# Patient Record
Sex: MALE | Race: WHITE | NOT HISPANIC OR LATINO | Employment: FULL TIME | URBAN - METROPOLITAN AREA
[De-identification: names, ages, dates, MRNs, and addresses within clinical notes are randomized per-mention and may not be internally consistent; named-entity substitution may affect disease eponyms.]

---

## 2018-03-09 ENCOUNTER — OFFICE VISIT (OUTPATIENT)
Dept: OPHTHALMOLOGY | Age: 28
End: 2018-03-09

## 2018-03-09 DIAGNOSIS — H52.7 DISORDER OF REFRACTION: Primary | ICD-10-CM

## 2018-03-09 DIAGNOSIS — Z46.0 CONTACT LENS/GLASSES FITTING: ICD-10-CM

## 2018-03-09 PROCEDURE — 92310 CONTACT LENS FITTING OU: CPT | Performed by: OPTOMETRIST

## 2018-03-09 PROCEDURE — 92015 DETERMINE REFRACTIVE STATE: CPT | Performed by: OPTOMETRIST

## 2018-03-09 PROCEDURE — 92014 COMPRE OPH EXAM EST PT 1/>: CPT | Performed by: OPTOMETRIST

## 2018-03-09 ASSESSMENT — REFRACTION_CURRENTRX
OD_BRAND: ACV OASYS TORIC
OD_BASECURVE: 8.6
OS_BASECURVE: 8.6
OD_SPHERE: -2.75
OS_CYLINDER: -1.75
OD_CYLINDER: -0.75
OS_AXIS: 140
OS_SPHERE: -2.50
OS_BRAND: ACV OASYS TORIC
OD_AXIS: 040

## 2018-03-09 ASSESSMENT — VISUAL ACUITY
OS_SC: 20/200
OS_CC: 20/20
CORRECTION_TYPE: GLASSES
OD_CC: 20/20
METHOD: SNELLEN - LINEAR
OD_SC: 20/200

## 2018-03-09 ASSESSMENT — REFRACTION_MANIFEST
OS_CYLINDER: +1.75
OD_AXIS: 120
OS_AXIS: 060
OS_SPHERE: -4.00
OD_SPHERE: -3.50
OD_CYLINDER: +0.75

## 2018-03-09 ASSESSMENT — REFRACTION_WEARINGRX
OS_CYLINDER: +1.75
OS_AXIS: 50
OD_SPHERE: -3.50
OD_AXIS: 130
OS_SPHERE: -4.25
OD_CYLINDER: +0.75

## 2018-03-09 ASSESSMENT — TONOMETRY
IOP_METHOD: NON-CONTACT AIR PUFF
OD_IOP_MMHG: 15
OS_IOP_MMHG: 16

## 2019-04-02 ENCOUNTER — OFFICE VISIT (OUTPATIENT)
Dept: URGENT CARE | Facility: URGENT CARE | Age: 29
End: 2019-04-02
Payer: OTHER MISCELLANEOUS

## 2019-04-02 VITALS
DIASTOLIC BLOOD PRESSURE: 77 MMHG | RESPIRATION RATE: 12 BRPM | TEMPERATURE: 98.2 F | BODY MASS INDEX: 33.48 KG/M2 | HEIGHT: 78 IN | WEIGHT: 289.4 LBS | OXYGEN SATURATION: 98 % | HEART RATE: 69 BPM | SYSTOLIC BLOOD PRESSURE: 124 MMHG

## 2019-04-02 DIAGNOSIS — M54.50 ACUTE BILATERAL LOW BACK PAIN WITHOUT SCIATICA: Primary | ICD-10-CM

## 2019-04-02 PROCEDURE — 99204 OFFICE O/P NEW MOD 45 MIN: CPT | Mod: 25 | Performed by: NURSE PRACTITIONER

## 2019-04-02 PROCEDURE — 96372 THER/PROPH/DIAG INJ SC/IM: CPT | Performed by: NURSE PRACTITIONER

## 2019-04-02 RX ORDER — KETOROLAC TROMETHAMINE 30 MG/ML
60 INJECTION, SOLUTION INTRAMUSCULAR; INTRAVENOUS ONCE
Status: COMPLETED | OUTPATIENT
Start: 2019-04-02 | End: 2019-04-02

## 2019-04-02 RX ORDER — KETOROLAC TROMETHAMINE 10 MG/1
10 TABLET, FILM COATED ORAL EVERY 6 HOURS PRN
Qty: 20 TABLET | Refills: 0 | Status: SHIPPED | OUTPATIENT
Start: 2019-04-02 | End: 2019-04-07

## 2019-04-02 RX ORDER — CYCLOBENZAPRINE HCL 10 MG
TABLET ORAL
Refills: 0 | COMMUNITY
Start: 2019-03-12

## 2019-04-02 RX ORDER — CYCLOBENZAPRINE HCL 10 MG
5-10 TABLET ORAL 3 TIMES DAILY PRN
Qty: 30 TABLET | Refills: 0 | Status: SHIPPED | OUTPATIENT
Start: 2019-04-02 | End: 2019-04-09

## 2019-04-02 RX ADMIN — KETOROLAC TROMETHAMINE 60 MG: 30 INJECTION, SOLUTION INTRAMUSCULAR; INTRAVENOUS at 15:22

## 2019-04-02 ASSESSMENT — ENCOUNTER SYMPTOMS
SORE THROAT: 0
COUGH: 0
FEVER: 0
NAUSEA: 0
BACK PAIN: 1
CHILLS: 0
DIARRHEA: 0
SHORTNESS OF BREATH: 0
VOMITING: 0
RHINORRHEA: 0
DIAPHORESIS: 0

## 2019-04-02 ASSESSMENT — PAIN SCALES - GENERAL: PAINLEVEL: MILD PAIN (3)

## 2019-04-02 ASSESSMENT — MIFFLIN-ST. JEOR: SCORE: 2405.96

## 2019-04-02 NOTE — PATIENT INSTRUCTIONS
Patient Education     Back Care Tips    Caring for your back  These are things you can do to prevent a recurrence of acute back pain and to reduce symptoms from chronic back pain:    Maintain a healthy weight. If you are overweight, losing weight will help most types of back pain.    Exercise is an important part of recovery from most types of back pain. The muscles behind and in front of the spine support the back. This means strengthening both the back muscles and the abdominal muscles will provide better support for your spine.     Swimming and brisk walking are good overall exercises to improve your fitness level.    Practice safe lifting methods (below).    Practice good posture when sitting, standing and walking. Avoid prolonged sitting. This puts more stress on the lower back than standing or walking.    Wear quality shoes with sufficient arch support. Foot and ankle alignment can affect back symptoms. Women should avoid wearing high heels.    Therapeutic massage can help relax the back muscles without stretching them.    During the first 24 to 72 hours after an acute injury or flare-up of chronic back pain, apply an ice pack to the painful area for 20 minutes and then remove it for 20 minutes, over a period of 60 to 90 minutes, or several times a day. As a safety precaution, do not use a heating pad at bedtime. Sleeping on a heating pad can lead to skin burns or tissue damage.    You can alternate ice and heat therapies.  Medicines  Talk to your healthcare provider before using medicines, especially if you have other medical problems or are taking other medicines.    You may use acetaminophen or ibuprofen to control pain, unless your healthcare provider prescribed other pain medicine. If you have chronic conditions like diabetes, liver or kidney disease, stomach ulcers, or gastrointestinal bleeding, or are taking blood thinners, talk with your healthcare provider before taking any medicines.    Be careful  if you are given prescription pain medicines, narcotics, or medicine for muscle spasm. They can cause drowsiness, affect your coordination, reflexes, and judgment. Do not drive or operate heavy machinery while taking these types of medicines. Take prescription pain medicine only as prescribed by your healthcare provider.  Lumbar stretch  Here is a simple stretching exercise that will help relax muscle spasm and keep your back more limber. If exercise makes your back pain worse, don t do it.    Lie on your back with your knees bent and both feet on the ground.    Slowly raise your left knee to your chest as you flatten your lower back against the floor. Hold for 5 seconds.    Relax and repeat the exercise with your right knee.    Do 10 of these exercises for each leg.  Safe lifting method    Don t bend over at the waist to lift an object off the floor.  Instead, bend your knees and hips in a squat.     Keep your back and head upright    Hold the object close to your body, directly in front of you.    Straighten your legs to lift the object.     Lower the object to the floor in the reverse fashion.    If you must slide something across the floor, push it.  Posture tips  Sitting  Sit in chairs with straight backs or low-back support. Keep your knees lower than your hips, with your feet flat on the floor.  When driving, sit up straight. Adjust the seat forward so you are not leaning toward the steering wheel.  A small pillow or rolled towel behind your lower back may help if you are driving long distances.   Standing  When standing for long periods, shift most of your weight to one leg at a time. Alternate legs every few minutes.   Sleeping  The best way to sleep is on your side with your knees bent. Put a low pillow under your head to support your neck in a neutral spine position. Avoid thick pillows that bend your neck to one side. Put a pillow between your legs to further relax your lower back. If you sleep on your  back, put pillows under your knees to support your legs in a slightly flexed position. Use a firm mattress. If your mattress sags, replace it, or use a 1/2-inch plywood board under the mattress to add support.  Follow-up care  Follow up with your healthcare provider, or as advised.  If X-rays, a CT scan or an MRI scan were taken, they will be reviewed by a radiologist. You will be notified of any new findings that may affect your care.  Call 911  Call 911 if any of the following occur:    Trouble breathing    Confusion    Very drowsy    Fainting or loss of consciousness    Rapid or very slow heart rate    Loss of  bowel or bladder control  When to seek medical advice  Call your healthcare provider right away if any of the following occur:    Pain becomes worse or spreads to your arms or legs    Weakness or numbness in one or both arms or legs    Numbness in the groin area  Date Last Reviewed: 6/1/2016 2000-2018 [employer]. [address] [place] [address], [place] [address]. All rights reserved. This information is not intended as a substitute for professional medical care. Always follow your healthcare professional's instructions.

## 2019-04-02 NOTE — PROGRESS NOTES
SUBJECTIVE:   Keyshawn Rose is a 28 year old male presenting with a chief complaint of   Chief Complaint   Patient presents with     Work Comp     3/29/19- injured back at home a few weeks ago- was feeling better and then reinjured it on 3/29/19 at work. Herniated disc 8-9 years ago      Back Pain     bilateral low back- worse on the left, left hip pain a couple days ago        He is a new patient of [place].    Back Pain    Onset of symptoms was 4 day(s) ago after lifting a heavy object at work. He reports that he originally fell on ice in February but got better after treatment with muscle relaxant was given given at [facility] urgent care on 3/12/2019  Location of pain: bilateral low back  Radiation: does not radiate  Context:       The injury happened while at work      Mechanism: recent heavy lifting      Patient experienced immediate pain  Course of symptoms is worsening.    Severity moderate  Current and Associated symptoms: pain and stiffness  Denies: fecal incontinence, urinary incontinence, lower extremity numbness, lower extremity weakness and paresthesia    Aggravating Factors: sitting and changing position  Therapies to improve symptoms include: ibuprofen  Past history: recurrent self limited episodes of low back pain in the past      Review of Systems   Constitutional: Negative for chills, diaphoresis and fever.   HENT: Negative for congestion, ear pain, rhinorrhea and sore throat.    Respiratory: Negative for cough and shortness of breath.    Gastrointestinal: Negative for diarrhea, nausea and vomiting.   Musculoskeletal: Positive for back pain.   All other systems reviewed and are negative.      History reviewed. No pertinent past medical history.  Family History   Problem Relation Age of Onset     Breast Cancer Mother 49     Myocardial Infarction Father 49        Heart attack     Current Outpatient Medications   Medication Sig Dispense Refill     cyclobenzaprine (FLEXERIL) 10 MG tablet TK SS  "TO 1 T PO TID PRN FOR MUSCLE SPASMS FOR UP TO 10 DAYS  0     cyclobenzaprine (FLEXERIL) 10 MG tablet Take 0.5-1 tablets (5-10 mg) by mouth 3 times daily as needed for muscle spasms 30 tablet 0     ketorolac (TORADOL) 10 MG tablet Take 1 tablet (10 mg) by mouth every 6 hours as needed for moderate pain 20 tablet 0     Social History     Tobacco Use     Smoking status: Never Smoker     Smokeless tobacco: Never Used   Substance Use Topics     Alcohol use: Not on file       OBJECTIVE  /77   Pulse 69   Temp 98.2  F (36.8  C) (Oral)   Resp 12   Ht 1.981 m (6' 6\")   Wt 131.3 kg (289 lb 6.4 oz)   SpO2 98%   BMI 33.44 kg/m      Physical Exam   Constitutional: No distress.   HENT:   Head: Normocephalic and atraumatic.   Right Ear: Tympanic membrane and external ear normal.   Left Ear: Tympanic membrane and external ear normal.   Mouth/Throat: Oropharynx is clear and moist.   Eyes: EOM are normal. Pupils are equal, round, and reactive to light.   Neck: Normal range of motion. Neck supple.   Cardiovascular: Normal rate and normal heart sounds.   Pulmonary/Chest: Effort normal and breath sounds normal. No respiratory distress.   Musculoskeletal:   Lumbosacral spine area reveals generalized tenderness without focal tenderness or mass.  Pain and reduced RS ROM noted which slightly limits the examination. Straight leg raise is equivocal at minimal degrees flexion on both sides.   NEURO: DTR's, motor strength and sensation normal.     Lymphadenopathy:     He has no cervical adenopathy.   Neurological: He is alert. No cranial nerve deficit.   Skin: Skin is warm and dry. He is not diaphoretic.   Psychiatric: He has a normal mood and affect.   Nursing note and vitals reviewed.      Labs:  No results found for this or any previous visit (from the past 24 hour(s)).      ASSESSMENT:      ICD-10-CM    1. Acute bilateral low back pain without sciatica M54.5 ketorolac (TORADOL) injection 60 mg     ketorolac (TORADOL) 10 MG " tablet     cyclobenzaprine (FLEXERIL) 10 MG tablet     KETOROLAC TROMETHAMINE 15MG     INJECTION INTRAMUSCULAR OR SUB-Q        Medical Decision Making:    Differential Diagnosis:  Back Pain: lumbosacral strain, degenerative disc disease, possible herniated disc , acute sciatica and compression fracture    Serious Comorbid Conditions:  Adult:  None    PLAN:  Rest the affected painful area as much as possible.  Apply ice for 15-20 minutes intermittently as needed and especially after any offending activity. Daily stretching.  As pain recedes, begin normal activities slowly as tolerated.  Consider Physical Therapy if symptoms not better with symptomatic care.  Followup: If not improving or if condition worsens, follow up with your Primary Care Provider    Patient Instructions     Patient Education     Back Care Tips    Caring for your back  These are things you can do to prevent a recurrence of acute back pain and to reduce symptoms from chronic back pain:    Maintain a healthy weight. If you are overweight, losing weight will help most types of back pain.    Exercise is an important part of recovery from most types of back pain. The muscles behind and in front of the spine support the back. This means strengthening both the back muscles and the abdominal muscles will provide better support for your spine.     Swimming and brisk walking are good overall exercises to improve your fitness level.    Practice safe lifting methods (below).    Practice good posture when sitting, standing and walking. Avoid prolonged sitting. This puts more stress on the lower back than standing or walking.    Wear quality shoes with sufficient arch support. Foot and ankle alignment can affect back symptoms. Women should avoid wearing high heels.    Therapeutic massage can help relax the back muscles without stretching them.    During the first 24 to 72 hours after an acute injury or flare-up of chronic back pain, apply an ice pack to the  painful area for 20 minutes and then remove it for 20 minutes, over a period of 60 to 90 minutes, or several times a day. As a safety precaution, do not use a heating pad at bedtime. Sleeping on a heating pad can lead to skin burns or tissue damage.    You can alternate ice and heat therapies.  Medicines  Talk to your healthcare provider before using medicines, especially if you have other medical problems or are taking other medicines.    You may use acetaminophen or ibuprofen to control pain, unless your healthcare provider prescribed other pain medicine. If you have chronic conditions like diabetes, liver or kidney disease, stomach ulcers, or gastrointestinal bleeding, or are taking blood thinners, talk with your healthcare provider before taking any medicines.    Be careful if you are given prescription pain medicines, narcotics, or medicine for muscle spasm. They can cause drowsiness, affect your coordination, reflexes, and judgment. Do not drive or operate heavy machinery while taking these types of medicines. Take prescription pain medicine only as prescribed by your healthcare provider.  Lumbar stretch  Here is a simple stretching exercise that will help relax muscle spasm and keep your back more limber. If exercise makes your back pain worse, don t do it.    Lie on your back with your knees bent and both feet on the ground.    Slowly raise your left knee to your chest as you flatten your lower back against the floor. Hold for 5 seconds.    Relax and repeat the exercise with your right knee.    Do 10 of these exercises for each leg.  Safe lifting method    Don t bend over at the waist to lift an object off the floor.  Instead, bend your knees and hips in a squat.     Keep your back and head upright    Hold the object close to your body, directly in front of you.    Straighten your legs to lift the object.     Lower the object to the floor in the reverse fashion.    If you must slide something across the  floor, push it.  Posture tips  Sitting  Sit in chairs with straight backs or low-back support. Keep your knees lower than your hips, with your feet flat on the floor.  When driving, sit up straight. Adjust the seat forward so you are not leaning toward the steering wheel.  A small pillow or rolled towel behind your lower back may help if you are driving long distances.   Standing  When standing for long periods, shift most of your weight to one leg at a time. Alternate legs every few minutes.   Sleeping  The best way to sleep is on your side with your knees bent. Put a low pillow under your head to support your neck in a neutral spine position. Avoid thick pillows that bend your neck to one side. Put a pillow between your legs to further relax your lower back. If you sleep on your back, put pillows under your knees to support your legs in a slightly flexed position. Use a firm mattress. If your mattress sags, replace it, or use a 1/2-inch plywood board under the mattress to add support.  Follow-up care  Follow up with your healthcare provider, or as advised.  If X-rays, a CT scan or an MRI scan were taken, they will be reviewed by a radiologist. You will be notified of any new findings that may affect your care.  Call 911  Call 911 if any of the following occur:    Trouble breathing    Confusion    Very drowsy    Fainting or loss of consciousness    Rapid or very slow heart rate    Loss of  bowel or bladder control  When to seek medical advice  Call your healthcare provider right away if any of the following occur:    Pain becomes worse or spreads to your arms or legs    Weakness or numbness in one or both arms or legs    Numbness in the groin area  Date Last Reviewed: 6/1/2016 2000-2018 [employer]. [address] [place] [address], [place] [address]. All rights reserved. This information is not intended as a substitute for professional medical care. Always follow your healthcare professional's  instructions.

## 2019-04-02 NOTE — PROGRESS NOTES
The following medication was given:     MEDICATION: Toradol 60 mg  ROUTE: IM  SITE: Ventrogluteal - Left  DOSE: 2ml  LOT #: [id number]  :  [employer]  EXPIRATION DATE:  10/20  NDC#: 78998-228-21    Bisi Cutler CMA

## 2019-04-02 NOTE — LETTER
[facility]  [address]  [facility] [address]  Phone: [phone]    April 2, 2019        Keyshawn Rose  [address] [facility] [address]          To whom it may concern:    RE: Keyshawn Rose    Patient was seen and treated today at our clinic.  Patient may return to work 4/3/2019 with the following restrictions to perform light duty. He is able to lift no more than 5 pounds for the next week.    Please contact me for questions or concerns.      Sincerely,        Linda Centeno NP,APRN

## 2020-02-26 NOTE — TELEPHONE ENCOUNTER
MEDICAL RECORDS REQUEST   [facility]  Urology Clinic  [address] [place] [address]  PHONE: [phone]  Fax: [phone]        FUTURE VISIT INFORMATION                                                   Keyshawn Rose, : 1990 scheduled for future visit at [facility]    APPOINTMENT INFORMATION:    Date: 3/19/20 8AM    Provider:  Dwayne Sosa MD    Reason for Visit/Diagnosis: Varicocele     REFERRAL INFORMATION:    Referring provider:  Self    Specialty: N/A    Referring providers clinic:  N/A    Clinic contact number:  N/A    RECORDS REQUESTED FOR VISIT                                                     NOTES  STATUS/DETAILS   OFFICE NOTE from referring provider  no   OFFICE NOTE from other specialist  yes   DISCHARGE SUMMARY from hospital  no   DISCHARGE REPORT from the ER  no   OPERATIVE REPORT  no   MEDICATION LIST  yes     PRE-VISIT CHECKLIST      Record collection complete Yes- [facility] recs in [facility]  Images in [facility] PACS   Fax to  to push related images - 1:24PM    Appointment appropriately scheduled           (right time/right provider) Yes   MyChart activation If no, please explain: In process    Questionnaire complete If no, please explain; In process      Completed by: Gisela Avery

## 2020-03-10 ENCOUNTER — PRE VISIT (OUTPATIENT)
Dept: UROLOGY | Facility: CLINIC | Age: 30
End: 2020-03-10

## 2020-03-10 NOTE — TELEPHONE ENCOUNTER
Reason for Visit: Consult    Diagnosis: Varicocele    Orders/Procedures/Records: in system    Contact Patient: n/a    Rooming Requirements: normal      Anahi iSlva LPN  03/10/20  9:48 AM

## 2020-03-16 ENCOUNTER — TELEPHONE (OUTPATIENT)
Dept: UROLOGY | Facility: CLINIC | Age: 30
End: 2020-03-16

## 2020-03-16 NOTE — TELEPHONE ENCOUNTER
LVM for patient to call back and get rescheduled out at least 4 weeks per the provider and due to COVID-19.  Anahi Silva LPN

## 2020-03-19 ENCOUNTER — PRE VISIT (OUTPATIENT)
Dept: UROLOGY | Facility: CLINIC | Age: 30
End: 2020-03-19

## 2020-04-09 ENCOUNTER — TELEPHONE (OUTPATIENT)
Dept: UROLOGY | Facility: CLINIC | Age: 30
End: 2020-04-09

## 2020-04-09 NOTE — TELEPHONE ENCOUNTER
"Left voicemail informing the patient that the clinic is converting appointments to Video Visits due to COVID-19. Patient was instructed to call the clinic back at [phone].      When the patient returns the call please convert their appointment to a Video Visit. The patient will need to download the application \"[employer]\". The patient will receive another call prior to their appointment time with further instructions.      Patti Grove, EMT  "

## 2020-04-13 PROBLEM — E66.811 OBESITY (BMI 30.0-34.9): Status: ACTIVE | Noted: 2020-04-13

## 2020-04-13 PROBLEM — H52.13 MYOPIA OF BOTH EYES: Status: ACTIVE | Noted: 2020-04-13

## 2020-04-13 PROBLEM — F90.9 ADHD: Status: ACTIVE | Noted: 2020-04-13

## 2020-04-13 PROBLEM — T81.89XA: Status: ACTIVE | Noted: 2020-04-13

## 2020-04-13 PROBLEM — H52.203: Status: ACTIVE | Noted: 2020-04-13

## 2020-04-14 ENCOUNTER — TELEPHONE (OUTPATIENT)
Dept: UROLOGY | Facility: CLINIC | Age: 30
End: 2020-04-14

## 2020-04-14 NOTE — TELEPHONE ENCOUNTER
Spoke with patient to convert their upcoming appointment with Dr. Sosa to a Video Visit. Patient will complete the visit on their computer. Confirmed email with patient.      Patti Grove, EMT

## 2020-04-23 ENCOUNTER — VIRTUAL VISIT (OUTPATIENT)
Dept: UROLOGY | Facility: CLINIC | Age: 30
End: 2020-04-23
Payer: COMMERCIAL

## 2020-04-23 DIAGNOSIS — I86.1 VARICOCELE: ICD-10-CM

## 2020-04-23 DIAGNOSIS — Z98.52 S/P VASECTOMY: ICD-10-CM

## 2020-04-23 DIAGNOSIS — N50.819 TESTIS PAIN: Primary | ICD-10-CM

## 2020-04-23 PROBLEM — Z79.899 CONTROLLED SUBSTANCE AGREEMENT SIGNED: Status: ACTIVE | Noted: 2019-05-21

## 2020-04-23 RX ORDER — DEXTROAMPHETAMINE SACCHARATE, AMPHETAMINE ASPARTATE MONOHYDRATE, DEXTROAMPHETAMINE SULFATE AND AMPHETAMINE SULFATE 5; 5; 5; 5 MG/1; MG/1; MG/1; MG/1
20 CAPSULE, EXTENDED RELEASE ORAL
COMMUNITY
Start: 2020-02-26

## 2020-04-23 ASSESSMENT — PAIN SCALES - GENERAL: PAINLEVEL: NO PAIN (0)

## 2020-04-23 NOTE — PROGRESS NOTES
"Keyshawn Rose is a 29 year old male who is being evaluated via a billable video visit.      The patient has been notified of following:     \"This video visit will be conducted via a call between you and your physician/provider. We have found that certain health care needs can be provided without the need for an in-person physical exam.  This service lets us provide the care you need with a video conversation.  If a prescription is necessary we can send it directly to your pharmacy.  If lab work is needed we can place an order for that and you can then stop by our lab to have the test done at a later time.    Video visits are billed at different rates depending on your insurance coverage.  Please reach out to your insurance provider with any questions.    If during the course of the call the physician/provider feels a video visit is not appropriate, you will not be charged for this service.\"    Patient has given verbal consent for Video visit? Yes    How would you like to obtain your AVS? Mail a copy    Patient would like the video invitation sent by: Send to e-mail at: [email or web address]    Will anyone else be joining your video visit? No        Video-Visit Details    Type of service:  Video Visit    Video Start Time: 8:30 AM  Video End Time: 0908    Originating Location (pt. Location): Home    Distant Location (provider location):  [facility] UROLOGY AND [facility] FOR PROSTATE AND UROLOGIC CANCERS     Mode of Communication:  Video Conference via Fwd: Power    I am seeing Keyshawn Rose in consultation for evaluation of varicocele.  New patient, self referred.    HPI:  Keyshawn Rose is a 29 year old male.  He is having scrotal pain.  Status-post varicocele repair July 2019 Dr Akins at [facility]-  on the left side- had transient improvement in pain and had bilateral vasectomy at the same time. I was able to review his operative report and related clinic notes surrounding this procedure.    He's had " some discomfort in the left groin since that time.  Pain can be made worse by pressing over the surgical area as Dr. Akins did at one of Keyshawn's follow-up appointments.    He had a more severe testis pain episode in Jan 2020 at a [facility]- had an episode of more severe pain for several days and was advised observation when he sought medical attention.  This type of more severe pain has improved with time, however, but discomfort still persists.    He has worst pain after ejaculation at this time.  Left testis area would be sore for several hours after ejaculation x 4-12 hours.    Imaging and shown a persisting/ recurrent left varicocele without other pathology:    Scrotal ultrasound 1/9/20:  IMPRESSION:  1.  Similar to slightly more prominent left varicocele.    4/17/19 scrotal ultrasound:  CONCLUSION:  1.  Left varicocele with vessels measuring up to 4 mm.  2.  Otherwise normal scrotal ultrasound study.     Most of his pain at this time is around the left inguinal incision, he states.  Testis pain is less than before surgery.      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     Fracture of wrist   age 8     Left varicocele   as teen     Myopia of both eyes     PAST SURG HX:  Varicocele repair and vasectomy 7//2019    FAMILY HX:  Family History   Problem Relation Age of Onset     Breast Cancer Mother 49     Myocardial Infarction Father 49        Heart attack       SOCIAL HX:  Social History     Tobacco Use     Smoking status: Never Smoker     Smokeless tobacco: Never Used   Substance Use Topics      Alcohol use: None     Drug use: None       MEDICATIONS:  Current Outpatient Medications   Medication Sig     amphetamine-dextroamphetamine (ADDERALL XR) 20 MG 24 hr capsule Take 20 mg by mouth     cyclobenzaprine (FLEXERIL) 10 MG tablet TK SS TO 1 T PO TID PRN FOR MUSCLE SPASMS FOR UP TO 10 DAYS     No current facility-administered medications for this visit.        ALLERGIES:  Pertussis vaccines and Amoxicillin      GENERAL PHYSICAL EXAM:   Exam  General- Alert, oriented, nad  Eyes- anicteric, EOMI.  Resps- normal, non-labored.No cough  Abdomen-  nondistended.   exam- deferred.   Neurological - no tremors  Psychiatric - no anxiety, alert & oriented   The rest of a comprehensive physical examination is deferred due to PHE (public health emergency) video visit restrictions.        ASSESSMENT:     Left inguinal pain status-post left varicocele repair and bilateral vasectomy.  I discussed with him that management of this type of post-surgical discomfort can be difficult.  The exact cause for his pain is not entirely clear.    Differential diagnosis is post-vasectomy pain syndrome, incisional pain, local scar tissue/clips from varicocele repair, nerve entrapment, and less likely pelvic muscle pain.    Persisting left varicocele status-post micro varicocele repair at outside facility.    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    PLAN:    Discussed embolization would be the preferred approach for persisting varicocele, versus laparoscopic varicocele repair at a higher level than his previous subinguinal repair.  A repeat microsurgical approach at the same level is not feasible, in my experience.  He will think about these options.  Embolization may help relieve what portion of his discomfort is from recurrent varicocele.  Pelvic floor physical therapy discussed as an option.      Discussed pain clinic referral is an option also, to explore non-surgical  options.  Pt states if he did get any relief of pain from cord block done 1/16/20 by Dr. Akins, relief was very transient.      I discussed post-vasectomy pain syndrome with him.  His post-ejaculatory pain does fit with this entity.  I discussed that management of this can range from observation, to excision of a sperm granuloma if present, to microscopic denervation of the spermatic cord, to epididymectomy or orchiectomy as a last resort ( depending on the nature and localization of pain symptoms.) Removal of surgical clips would be likely very difficult and would risk damage to the solitary left internal spermatic artery that was preserved during his varicocele repair.  Denervation would probably not be feasible at a sub-inguinal level due to scar tissue from the last surgery- but could be approached laparoscopically if he responded favorable to cord block.      He wishes to observe the pain for now, and let me know if he has further questions in the future.        Dwayne Sosa MD  Urological Surgeon, Assoc Prof

## 2020-04-23 NOTE — LETTER
"4/23/2020      RE: Keyshawn Rose  [address] [facility] [address]       Keyshawn Rose is a 29 year old male who is being evaluated via a billable video visit.      The patient has been notified of following:     \"This video visit will be conducted via a call between you and your physician/provider. We have found that certain health care needs can be provided without the need for an in-person physical exam.  This service lets us provide the care you need with a video conversation.  If a prescription is necessary we can send it directly to your pharmacy.  If lab work is needed we can place an order for that and you can then stop by our lab to have the test done at a later time.    Video visits are billed at different rates depending on your insurance coverage.  Please reach out to your insurance provider with any questions.    If during the course of the call the physician/provider feels a video visit is not appropriate, you will not be charged for this service.\"    Patient has given verbal consent for Video visit? Yes    How would you like to obtain your AVS? Mail a copy    Patient would like the video invitation sent by: Send to e-mail at: [email or web address]    Will anyone else be joining your video visit? No        Video-Visit Details    Type of service:  Video Visit    Video Start Time: 8:30 AM  Video End Time: 0908    Originating Location (pt. Location): Home    Distant Location (provider location):  [facility] UROLOGY AND [facility] FOR PROSTATE AND UROLOGIC CANCERS     Mode of Communication:  Video Conference via [employer]    I am seeing Keyshawn Rose in consultation for evaluation of varicocele.  New patient, self referred.    HPI:  Keyshawn Rose is a 29 year old male.  He is having scrotal pain.  Status-post varicocele repair July 2019 Dr Akins at [facility]-  on the left side- had transient improvement in pain and had bilateral vasectomy at the same time. I was able to review his " operative report and related clinic notes surrounding this procedure.    He's had some discomfort in the left groin since that time.  Pain can be made worse by pressing over the surgical area as Dr. Akins did at one of Keyshawn's follow-up appointments.    He had a more severe testis pain episode in Jan 2020 at a [facility]- had an episode of more severe pain for several days and was advised observation when he sought medical attention.  This type of more severe pain has improved with time, however, but discomfort still persists.    He has worst pain after ejaculation at this time.  Left testis area would be sore for several hours after ejaculation x 4-12 hours.    Imaging and shown a persisting/ recurrent left varicocele without other pathology:    Scrotal ultrasound 1/9/20:  IMPRESSION:  1.  Similar to slightly more prominent left varicocele.    4/17/19 scrotal ultrasound:  CONCLUSION:  1.  Left varicocele with vessels measuring up to 4 mm.  2.  Otherwise normal scrotal ultrasound study.     Most of his pain at this time is around the left inguinal incision, he states.  Testis pain is less than before surgery.      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     Fracture of wrist   age 8     Left varicocele   as teen     Myopia of both eyes     PAST SURG HX:  Varicocele repair and vasectomy 7//2019    FAMILY HX:  Family History   Problem Relation Age of Onset     Breast Cancer Mother 49     Myocardial Infarction Father 49        Heart attack       SOCIAL HX:  Social History     Tobacco Use      Smoking status: Never Smoker     Smokeless tobacco: Never Used   Substance Use Topics     Alcohol use: None     Drug use: None       MEDICATIONS:  Current Outpatient Medications   Medication Sig     amphetamine-dextroamphetamine (ADDERALL XR) 20 MG 24 hr capsule Take 20 mg by mouth     cyclobenzaprine (FLEXERIL) 10 MG tablet TK SS TO 1 T PO TID PRN FOR MUSCLE SPASMS FOR UP TO 10 DAYS     No current facility-administered medications for this visit.        ALLERGIES:  Pertussis vaccines and Amoxicillin      GENERAL PHYSICAL EXAM:   Exam  General- Alert, oriented, nad  Eyes- anicteric, EOMI.  Resps- normal, non-labored.No cough  Abdomen-  nondistended.   exam- deferred.   Neurological - no tremors  Psychiatric - no anxiety, alert & oriented   The rest of a comprehensive physical examination is deferred due to PHE (public health emergency) video visit restrictions.        ASSESSMENT:     Left inguinal pain status-post left varicocele repair and bilateral vasectomy.  I discussed with him that management of this type of post-surgical discomfort can be difficult.  The exact cause for his pain is not entirely clear.    Differential diagnosis is post-vasectomy pain syndrome, incisional pain, local scar tissue/clips from varicocele repair, nerve entrapment, and less likely pelvic muscle pain.    Persisting left varicocele status-post micro varicocele repair at outside facility.    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    PLAN:    Discussed embolization would be the preferred approach for persisting varicocele, versus laparoscopic varicocele repair at a higher level than his previous subinguinal repair.  A repeat microsurgical approach at the same level is not feasible, in my experience.  He will think about these options.  Embolization may help relieve what portion of his discomfort is from recurrent varicocele.  Pelvic floor physical therapy discussed as an  option.      Discussed pain clinic referral is an option also, to explore non-surgical options.  Pt states if he did get any relief of pain from cord block done 1/16/20 by Dr. Akins, relief was very transient.      I discussed post-vasectomy pain syndrome with him.  His post-ejaculatory pain does fit with this entity.  I discussed that management of this can range from observation, to excision of a sperm granuloma if present, to microscopic denervation of the spermatic cord, to epididymectomy or orchiectomy as a last resort ( depending on the nature and localization of pain symptoms.) Removal of surgical clips would be likely very difficult and would risk damage to the solitary left internal spermatic artery that was preserved during his varicocele repair.  Denervation would probably not be feasible at a sub-inguinal level due to scar tissue from the last surgery- but could be approached laparoscopically if he responded favorable to cord block.      He wishes to observe the pain for now, and let me know if he has further questions in the future.        Dwayne Sosa MD  Urological Surgeon, Assoc Prof            Dwayne Sosa MD

## 2020-04-23 NOTE — NURSING NOTE
Chief Complaint   Patient presents with     Consult     New patient consult for varicocele     Fe Art, [facility]

## 2021-05-23 ENCOUNTER — HOSPITAL ENCOUNTER (EMERGENCY)
Facility: CLINIC | Age: 31
Discharge: HOME | End: 2021-05-23
Attending: EMERGENCY MEDICINE | Admitting: EMERGENCY MEDICINE
Payer: COMMERCIAL

## 2021-05-23 ENCOUNTER — APPOINTMENT (OUTPATIENT)
Dept: GENERAL RADIOLOGY | Facility: CLINIC | Age: 31
End: 2021-05-23
Attending: EMERGENCY MEDICINE
Payer: COMMERCIAL

## 2021-05-23 VITALS
HEIGHT: 78 IN | BODY MASS INDEX: 33.44 KG/M2 | OXYGEN SATURATION: 97 % | TEMPERATURE: 98.2 F | HEART RATE: 79 BPM | RESPIRATION RATE: 18 BRPM | DIASTOLIC BLOOD PRESSURE: 77 MMHG | SYSTOLIC BLOOD PRESSURE: 127 MMHG

## 2021-05-23 DIAGNOSIS — J20.9 ACUTE BRONCHITIS, UNSPECIFIED ORGANISM: ICD-10-CM

## 2021-05-23 LAB
BASOPHILS # BLD AUTO: 0 10E9/L (ref 0–0.2)
BASOPHILS NFR BLD AUTO: 0.3 %
DIFFERENTIAL METHOD BLD: ABNORMAL
EOSINOPHIL # BLD AUTO: 0.1 10E9/L (ref 0–0.7)
EOSINOPHIL NFR BLD AUTO: 0.9 %
ERYTHROCYTE [DISTWIDTH] IN BLOOD BY AUTOMATED COUNT: 12.2 % (ref 10–15)
HCT VFR BLD AUTO: 42.7 % (ref 40–53)
HGB BLD-MCNC: 14.6 G/DL (ref 13.3–17.7)
IMM GRANULOCYTES # BLD: 0.1 10E9/L (ref 0–0.4)
IMM GRANULOCYTES NFR BLD: 0.4 %
LYMPHOCYTES # BLD AUTO: 1.9 10E9/L (ref 0.8–5.3)
LYMPHOCYTES NFR BLD AUTO: 16 %
MCH RBC QN AUTO: 30 PG (ref 26.5–33)
MCHC RBC AUTO-ENTMCNC: 34.2 G/DL (ref 31.5–36.5)
MCV RBC AUTO: 88 FL (ref 78–100)
MONOCYTES # BLD AUTO: 0.9 10E9/L (ref 0–1.3)
MONOCYTES NFR BLD AUTO: 7.5 %
NEUTROPHILS # BLD AUTO: 9.1 10E9/L (ref 1.6–8.3)
NEUTROPHILS NFR BLD AUTO: 74.9 %
NRBC # BLD AUTO: 0 10*3/UL
NRBC BLD AUTO-RTO: 0 /100
PLATELET # BLD AUTO: 254 10E9/L (ref 150–450)
RBC # BLD AUTO: 4.87 10E12/L (ref 4.4–5.9)
WBC # BLD AUTO: 12.1 10E9/L (ref 4–11)

## 2021-05-23 PROCEDURE — 71046 X-RAY EXAM CHEST 2 VIEWS: CPT | Mod: 26 | Performed by: RADIOLOGY

## 2021-05-23 PROCEDURE — 99284 EMERGENCY DEPT VISIT MOD MDM: CPT | Mod: 25 | Performed by: EMERGENCY MEDICINE

## 2021-05-23 PROCEDURE — 85025 COMPLETE CBC W/AUTO DIFF WBC: CPT | Performed by: EMERGENCY MEDICINE

## 2021-05-23 PROCEDURE — 71046 X-RAY EXAM CHEST 2 VIEWS: CPT

## 2021-05-23 PROCEDURE — 99284 EMERGENCY DEPT VISIT MOD MDM: CPT | Performed by: EMERGENCY MEDICINE

## 2021-05-23 RX ORDER — AZITHROMYCIN 250 MG/1
TABLET, FILM COATED ORAL
Qty: 6 TABLET | Refills: 0 | Status: SHIPPED | OUTPATIENT
Start: 2021-05-23 | End: 2021-05-28

## 2021-05-23 ASSESSMENT — ENCOUNTER SYMPTOMS
CHEST TIGHTNESS: 1
ARTHRALGIAS: 0
COLOR CHANGE: 0
DIFFICULTY URINATING: 0
COUGH: 1
SHORTNESS OF BREATH: 0
NECK STIFFNESS: 0
ABDOMINAL PAIN: 0
HEADACHES: 0
DIAPHORESIS: 0
CONFUSION: 0
EYE REDNESS: 0
FEVER: 0
CHILLS: 0

## 2021-05-23 NOTE — ED PROVIDER NOTES
"ED Provider Note  [facility] EMERGENCY DEPARTMENT ([facility])  May 23, 2021    History     Chief Complaint   Patient presents with     Hemoptysis     HPI  Keyshawn Rose is a 30 year old male with a past medical history including obesity who presents to the Emergency Department for evaluation of productive cough.  Patient reports that for the past 2 weeks he has had a productive cough, went to the urgent care 4 days ago and was prescribed inhalers and prednisone for bronchitis.  He states that he seemed okay yesterday doing yard work, but that when he woke up today and started to move around he started coughing.  Noticed spots of blood in his cough.  He says that the sputum has been yellow and green in the past but today was clear with red.  States that he feels \"chest discomfort\", denies chest pain.  Endorses sore throat, denies shortness of breath, or fevers chills sweats.  Endorses nasal congestion.    Past Medical History  History reviewed. No pertinent past medical history.  History reviewed. No pertinent surgical history.  azithromycin (ZITHROMAX Z-CHERELLE) 250 MG tablet  amphetamine-dextroamphetamine (ADDERALL XR) 20 MG 24 hr capsule  cyclobenzaprine (FLEXERIL) 10 MG tablet      Allergies   Allergen Reactions     Pertussis Vaccines Unknown     Amoxicillin Rash     Family History  Family History   Problem Relation Age of Onset     Breast Cancer Mother 49     Myocardial Infarction Father 49        Heart attack     Social History   Social History     Tobacco Use     Smoking status: Never Smoker     Smokeless tobacco: Never Used   Substance Use Topics     Alcohol use: Yes     Comment: occasionally      Drug use: Yes     Types: Marijuana      Past medical history, past surgical history, medications, allergies, family history, and social history were reviewed with the patient. No additional pertinent items.       Review of Systems   Constitutional: Negative for " "chills, diaphoresis and fever.   HENT: Positive for congestion.    Eyes: Negative for redness.   Respiratory: Positive for cough and chest tightness. Negative for shortness of breath.    Cardiovascular: Negative for chest pain.   Gastrointestinal: Negative for abdominal pain.   Genitourinary: Negative for difficulty urinating.   Musculoskeletal: Negative for arthralgias and neck stiffness.   Skin: Negative for color change.   Neurological: Negative for headaches.   Psychiatric/Behavioral: Negative for confusion.   All other systems reviewed and are negative.    A complete review of systems was performed with pertinent positives and negatives noted in the HPI, and all other systems negative.    Physical Exam   BP: (!) 147/93  Pulse: 82  Temp: 99  F (37.2  C)  Resp: 18  Height: 198.1 cm (6' 6\")  SpO2: 98 %  Physical Exam  Constitutional:       General: He is not in acute distress.     Appearance: He is not diaphoretic.   HENT:      Head: Atraumatic.   Eyes:      General: No scleral icterus.     Pupils: Pupils are equal, round, and reactive to light.   Cardiovascular:      Heart sounds: Normal heart sounds.   Pulmonary:      Effort: No respiratory distress.      Breath sounds: Normal breath sounds.   Abdominal:      General: Bowel sounds are normal.      Palpations: Abdomen is soft.      Tenderness: There is no abdominal tenderness.   Musculoskeletal:         General: No tenderness.   Skin:     General: Skin is warm.      Findings: No rash.         ED Course     12:54 PM  The patient was seen and examined by Kashmir Cerna MD in Room ED16.    Procedures        The medical record was reviewed and interpreted.  Current labs reviewed and interpreted.  Previous labs reviewed and interpreted.  Current images reviewed and interpreted: No acute infiltrates.       Results for orders placed or performed during the hospital encounter of 05/23/21   XR Chest 2 Views     Status: None (Preliminary result)    Impression    RESIDENT " PRELIMINARY INTERPRETATION  IMPRESSION: No focal airspace disease.   CBC with platelets differential     Status: Abnormal   Result Value Ref Range    WBC 12.1 (H) 4.0 - 11.0 10e9/L    RBC Count 4.87 4.4 - 5.9 10e12/L    Hemoglobin 14.6 13.3 - 17.7 g/dL    Hematocrit 42.7 40.0 - 53.0 %    MCV 88 78 - 100 fl    MCH 30.0 26.5 - 33.0 pg    MCHC 34.2 31.5 - 36.5 g/dL    RDW 12.2 10.0 - 15.0 %    Platelet Count 254 150 - 450 10e9/L    Diff Method Automated Method     % Neutrophils 74.9 %    % Lymphocytes 16.0 %    % Monocytes 7.5 %    % Eosinophils 0.9 %    % Basophils 0.3 %    % Immature Granulocytes 0.4 %    Nucleated RBCs 0 0 /100    Absolute Neutrophil 9.1 (H) 1.6 - 8.3 10e9/L    Absolute Lymphocytes 1.9 0.8 - 5.3 10e9/L    Absolute Monocytes 0.9 0.0 - 1.3 10e9/L    Absolute Eosinophils 0.1 0.0 - 0.7 10e9/L    Absolute Basophils 0.0 0.0 - 0.2 10e9/L    Abs Immature Granulocytes 0.1 0 - 0.4 10e9/L    Absolute Nucleated RBC 0.0      Medications - No data to display     Assessments & Plan (with Medical Decision Making)   30-year-old male who presents for evaluation of persistent cough now with scant hemoptysis.  Differential includes posterior nasal bleed, bronchitis, pneumonia, mass, pharyngeal or tracheal bleeding.  Examination was grossly unremarkable including clear lungs and normal vital signs.  Chest x-ray was obtained which is normal.  Given 16-day history of URI type symptoms, suspect bronchitis will treat with azithromycin, continued inhaler and finish course of prednisone.    I have reviewed the nursing notes. I have reviewed the findings, diagnosis, plan and need for follow up with the patient.    Discharge Medication List as of 5/23/2021  2:34 PM      START taking these medications    Details   azithromycin (ZITHROMAX Z-CHERELLE) 250 MG tablet Two tablets on the first day, then one tablet daily for the next 4 days, Disp-6 tablet, R-0, E-Prescribe             Final diagnoses:   Acute bronchitis, unspecified  organism     I, Kathleen Obrien, am serving as a trained medical scribe to document services personally performed by Kashmir Cerna MD, based on the provider's statements to me.   I, Kashmir Cerna MD, was physically present and have reviewed and verified the accuracy of this note documented by Kathleen Obrien.  --  Kashmir Cerna MD  [facility] EMERGENCY DEPARTMENT  5/23/2021     Kashmir Cerna MD  05/23/21 0772

## 2021-05-23 NOTE — ED TRIAGE NOTES
Pt c/o coughing up blood that started today around 1000 when pt woke up. Recently being treated for bronchitis. Given inhalers, prednisone . Pt unsure of what color blood is.

## 2021-08-15 ENCOUNTER — HEALTH MAINTENANCE LETTER (OUTPATIENT)
Age: 31
End: 2021-08-15

## 2021-10-10 ENCOUNTER — HEALTH MAINTENANCE LETTER (OUTPATIENT)
Age: 31
End: 2021-10-10

## 2022-09-18 ENCOUNTER — HEALTH MAINTENANCE LETTER (OUTPATIENT)
Age: 32
End: 2022-09-18

## 2023-10-08 ENCOUNTER — HEALTH MAINTENANCE LETTER (OUTPATIENT)
Age: 33
End: 2023-10-08